# Patient Record
Sex: FEMALE | Race: WHITE | NOT HISPANIC OR LATINO | Employment: STUDENT | URBAN - METROPOLITAN AREA
[De-identification: names, ages, dates, MRNs, and addresses within clinical notes are randomized per-mention and may not be internally consistent; named-entity substitution may affect disease eponyms.]

---
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
## 2023-01-07 ENCOUNTER — HEALTH MAINTENANCE LETTER (OUTPATIENT)
Age: 23
End: 2023-01-07

## 2023-09-01 ENCOUNTER — TELEPHONE (OUTPATIENT)
Dept: FAMILY MEDICINE | Facility: OTHER | Age: 23
End: 2023-09-01

## 2023-09-01 DIAGNOSIS — Z13.0 SCREENING FOR SICKLE-CELL DISEASE OR TRAIT: Primary | ICD-10-CM

## 2023-09-01 NOTE — TELEPHONE ENCOUNTER
Mom calling in regards to sickle cell testing for collegiate sports. Patient was born in [place], not [place]. Advised mom to call records department where patient was born to see if this was on her  screening. Mom will callback with an answer but does have lab appt scheduled on Tuesday for this if needed.     Patient last seen in FP on 21 as mom states she has been healthy and at school and has not needed to come in. Is looking for provider willing to place lab order.

## 2023-09-05 ENCOUNTER — LAB (OUTPATIENT)
Dept: LAB | Facility: OTHER | Age: 23
End: 2023-09-05

## 2023-09-05 DIAGNOSIS — Z13.0 SCREENING FOR SICKLE-CELL DISEASE OR TRAIT: ICD-10-CM

## 2023-09-05 PROCEDURE — 36415 COLL VENOUS BLD VENIPUNCTURE: CPT

## 2023-09-05 PROCEDURE — 99000 SPECIMEN HANDLING OFFICE-LAB: CPT

## 2023-09-05 PROCEDURE — 83021 HEMOGLOBIN CHROMOTOGRAPHY: CPT | Mod: 90

## 2023-09-05 NOTE — TELEPHONE ENCOUNTER
Ok will order lab only but will not be able to sign off any forms that require evaluation of patient.     Lyubov Mike PA-C

## 2023-09-05 NOTE — TELEPHONE ENCOUNTER
Usually if she is playing sports in college and they require sickle cell testing they also require a sports physical, please see if that is being done. I have not seen patient in 2 years.     Lyubov Mike PA-C

## 2023-09-08 LAB — HGB S BLD QL: NEGATIVE

## 2024-02-10 ENCOUNTER — HEALTH MAINTENANCE LETTER (OUTPATIENT)
Age: 24
End: 2024-02-10

## 2024-10-28 ENCOUNTER — APPOINTMENT (OUTPATIENT)
Dept: URGENT CARE | Facility: URGENT CARE | Age: 24
End: 2024-10-28
Payer: COMMERCIAL

## 2025-03-08 ENCOUNTER — HEALTH MAINTENANCE LETTER (OUTPATIENT)
Age: 25
End: 2025-03-08